# Patient Record
Sex: FEMALE | Race: ASIAN | NOT HISPANIC OR LATINO | URBAN - METROPOLITAN AREA
[De-identification: names, ages, dates, MRNs, and addresses within clinical notes are randomized per-mention and may not be internally consistent; named-entity substitution may affect disease eponyms.]

---

## 2024-04-16 ENCOUNTER — OFFICE (OUTPATIENT)
Dept: URBAN - METROPOLITAN AREA CLINIC 19 | Facility: CLINIC | Age: 74
End: 2024-04-16

## 2024-04-16 VITALS
HEART RATE: 56 BPM | DIASTOLIC BLOOD PRESSURE: 74 MMHG | WEIGHT: 152 LBS | SYSTOLIC BLOOD PRESSURE: 133 MMHG | HEIGHT: 63 IN | OXYGEN SATURATION: 98 %

## 2024-04-16 DIAGNOSIS — K59.00 CONSTIPATION, UNSPECIFIED: ICD-10-CM

## 2024-04-16 DIAGNOSIS — R10.30 LOWER ABDOMINAL PAIN, UNSPECIFIED: ICD-10-CM

## 2024-04-16 PROCEDURE — 99203 OFFICE O/P NEW LOW 30 MIN: CPT

## 2024-04-16 RX ORDER — POLYETHYLENE GLYCOL 3350, SODIUM SULFATE, POTASSIUM CHLORIDE, MAGNESIUM SULFATE, AND SODIUM CHLORIDE FOR ORAL SOLUTION 178.7-7.3G
KIT ORAL
Qty: 1 | Refills: 0 | Status: ACTIVE
Start: 2024-04-16

## 2024-04-16 NOTE — SERVICEHPINOTES
73-year-old female is here with complaints of abdominal pain.  Reports intermittent dull bilateral lower abdominal pain for the last 3 weeks.  Reports she is scheduled for CT scan ordered by her PCP.  The pain seems to be improved after defecation.  Reports daily bowel movements with a sense of incomplete evacuation.  Drinks adequate water.  Complaining of gas.  She is a vegetarian and eats lots of salads and leafy green vegetables. Does drink any carbonated beverages or sodas or alcohol.  Does not use any artificial sweeteners or consume any dairy products.  Denies melena or hematochezia or hematemesis.  Denies any upper GI symptoms or any abnormal weight loss.  No family history of colon cancer, colon polyps, or IBD.  Informs me she has a history of congestive heart failure and is followed by Dr. Kenneth Cheema.  Not taking any diuretics at this time.  Reports normal echocardiogram in June 2023.  No history of myocardial infarction or abnormal heart rhythm.  Not taking any blood thinners or weight loss medications.  Informs me she had a colonoscopy with GI specialist at [facility] in 2014. I do not have access to these records at this time.